# Patient Record
Sex: MALE | Race: WHITE | URBAN - METROPOLITAN AREA
[De-identification: names, ages, dates, MRNs, and addresses within clinical notes are randomized per-mention and may not be internally consistent; named-entity substitution may affect disease eponyms.]

---

## 2017-02-03 PROBLEM — Z71.2 ENCOUNTER TO DISCUSS TEST RESULTS: Status: ACTIVE | Noted: 2017-02-03

## 2017-05-12 PROBLEM — Z08 ENCOUNTER FOR FOLLOW-UP SURVEILLANCE OF COLON CANCER: Status: ACTIVE | Noted: 2017-05-12

## 2017-05-12 PROBLEM — Z85.038 ENCOUNTER FOR FOLLOW-UP SURVEILLANCE OF COLON CANCER: Status: ACTIVE | Noted: 2017-05-12

## 2017-08-03 PROBLEM — Z92.21 HISTORY OF CHEMOTHERAPY: Status: ACTIVE | Noted: 2017-08-03

## 2018-09-27 PROBLEM — Z71.2 ENCOUNTER TO DISCUSS TEST RESULTS: Status: RESOLVED | Noted: 2017-02-03 | Resolved: 2018-09-27

## 2020-01-20 NOTE — PROGRESS NOTES
[facility]) Surgical Oncology  [facility]    HPI: Dear Dr. Ronnie Harvey, Thank you for referring Mr. Narendra Antunez for management of liver masses. Taisha Monzon is followed by Dr Ronnie Harvey for a history of [id number] - perforating sigmoid colon cancer diagnosed in 2016. He is S/P sigmoid colectomy with colostomy. In January 2017, he completed adjuvant FOLFOX chemotherapy x 12 cycles. Had colostomy closure and then noted to have local recurrence. Pt underwent resection of colon recurrence with ileostomy. He then received off protocol adjuvant Xeloda, completing it in January 2019. January 2019 PET showed no evidence of residual or recurrent disease. Had ileostomy reversal and incisional hernia repair. Follow-up surveillance  CT scan in January 2020 demonstrated numerous liver masses. Patient is scheduled for a CT guided biopsy of the liver on 1/24/20 at [facility]. Currently denies fever's, nausea, vomiting, change in appetite or bowel pattern. No blood in stool.      Past Medical History:   Diagnosis Date    Allergic     Asthma     Cancer ([facility] [address]     Hypertension      Past Surgical History:   Procedure Laterality Date    COLECTOMY      TUNNELED VENOUS PORT PLACEMENT       Social:   Social History     Tobacco Use    Smoking status: Former Smoker     Packs/day: 1.00     Years: 15.00     Pack years: 15.00     Types: Cigarettes    Smokeless tobacco: Former User     Quit date: 1995   Substance Use Topics    Alcohol use: Yes     Comment: occasionally    Drug use: Not on file     Family History   Problem Relation Age of Onset    Alcohol Abuse Mother     Asthma Son      Allergies: Norvasc [amlodipine]  Current Outpatient Medications   Medication Sig Dispense Refill    pantoprazole (PROTONIX) 40 MG tablet TK 1 T PO QAM B PADMINI      vitamin D (CHOLECALCIFEROL) 1000 UNIT TABS tablet Take 1,000 Units by mouth daily      losartan (COZAAR) 100 MG tablet Take 100 mg by mouth daily       No current facility-administered medications for this visit. Review of Systems: See HPI  All other systems reviewed and are negative. Vitals:    01/21/20 0857   BP: 118/78   Pulse: 76   Temp: 97.5 °F (36.4 °C)   TempSrc: Oral   SpO2: 97%   Weight: 241 lb (109.3 kg)   Height: 5' 11\" (1.803 m)     Wt Readings from Last 3 Encounters:   01/21/20 241 lb (109.3 kg)   08/11/17 277 lb (125.6 kg)   08/07/17 275 lb 12.8 oz (125.1 kg)     Body mass index is 33.61 kg/m². Physical Exam:   Constitutional: He is oriented to person, place, and time. He appears well-developed and well-nourished. No distress. HENT: Moist mucus membranes  Head: Normocephalic and atraumatic. Eyes: EOM are normal. Pupils are equal, round, and no icterus. Neck: Normal range of motion. Neck supple. No thyromegaly present. Cardiovascular: Normal rate and regular rhythm by peripheral pulses. Pulmonary/Chest: No respiratory distress. Bilateral symmetrical chest rise. Abdominal: Soft. He exhibits no distension and no mass. There is no hepatosplenomegaly. No tenderness. Large midline incision well healed, multiple surgical sites well healed, no palpable hernias. Extremities: He exhibits no bilateral edema. Lymphadenopathy: He has no bilateral cervical adenopathy. Neurological: Grossly intact motor and sensory exam.   Skin: Skin is warm and dry. Psychiatric: He has a normal mood and affect. Appropriate thought process and judgement capacity. CEA 1/6/20: 3.41(High), previously 2.65 (OCT.)    CT Chest 1/18./20:     No metastases in the chest    Old benign T10 compression fracture    Numerous liver metastases, possibly enlarging slightly since 1/6/2020    Cholelithiasis    CT ABD/Pelvis 1/6/20:     Interval development of hepatic metastatic disease. Enlarged mesenteric lymph nodes consistent with malignancy. Interval ventral hernia repair. Cholelithiasis.  No additional CT evidence of cholecystitis. Assessment/Plan:   Diagnosis Orders   1.  Cancer of sigmoid colon ([address] 2. Liver mass     3. Mesenteric mass       Reviewed complex history pt has. I discussed with the Alethea Merlin about the diagnosis and management options:     - Patient will need to get a colonoscopy ASAP to evaluate for further colon tumors, patient understood and will follow up with Dr. Ivonne Araujo. - CEA has never been elevated significantly and so unable to use as an indicator. - CT scans reviewed and discussed with patient - appears to have one on left side and two on right side - technically resectable. No previous liver masses on previous scans, will review case with tumor board and have radiologist review on exact number of lesions and locations. - recommend genetic testing, patient follows with Dr. Roxanne Clay for his colon cancer   - patient scheduled for biopsy on 1/24 at [facility], discussed with patient possible bx of enlarged mesenteric mass / lymph nodes also. - discussed surgical options with patient and wife for liver masses; surgery vs ablation vs embolization with radiation seeds (therospheres). Avastin based chemotherapy was discussed as an option, as he has not yet been treated with it. Discussed stage 4 colon cancer and prognosis with patient and wife. - Recommend chemo first - discussed with Dr. Roxanne Clay. Depending on response, further chemo vs liver directed treatment vs resection of all known sites of disease. Skylar Monsivais MD  Surgical Oncologist    Shannon Sosa RN, am scribing for and in the presence of Dr Skylar Monsivais. Dayo Cheng RN,     I, Dr. Skylar Monsivais, personally performed the services described in this documentation as scribed by Dayo Cheng RN in my presence, and it is both accurate and complete.      Skylar Monsivais MD  Surgery Attending

## 2020-01-21 ENCOUNTER — OFFICE VISIT (OUTPATIENT)
Dept: SURGERY | Age: 55
End: 2020-01-21
Payer: COMMERCIAL

## 2020-01-21 VITALS
BODY MASS INDEX: 33.74 KG/M2 | HEART RATE: 76 BPM | SYSTOLIC BLOOD PRESSURE: 118 MMHG | OXYGEN SATURATION: 97 % | DIASTOLIC BLOOD PRESSURE: 78 MMHG | WEIGHT: 241 LBS | HEIGHT: 71 IN | TEMPERATURE: 97.5 F

## 2020-01-21 PROCEDURE — G8417 CALC BMI ABV UP PARAM F/U: HCPCS | Performed by: SURGERY

## 2020-01-21 PROCEDURE — G8427 DOCREV CUR MEDS BY ELIG CLIN: HCPCS | Performed by: SURGERY

## 2020-01-21 PROCEDURE — 99205 OFFICE O/P NEW HI 60 MIN: CPT | Performed by: SURGERY

## 2020-01-21 PROCEDURE — G8484 FLU IMMUNIZE NO ADMIN: HCPCS | Performed by: SURGERY

## 2020-01-21 RX ORDER — PANTOPRAZOLE SODIUM 40 MG/1
TABLET, DELAYED RELEASE ORAL
COMMUNITY
Start: 2019-10-20

## 2020-01-29 ENCOUNTER — TELEPHONE (OUTPATIENT)
Dept: SURGERY | Age: 55
End: 2020-01-29

## 2021-03-13 ENCOUNTER — IMMUNIZATION (OUTPATIENT)
Dept: PRIMARY CARE CLINIC | Age: 56
End: 2021-03-13
Payer: COMMERCIAL

## 2021-03-13 PROCEDURE — 0031A COVID-19, J&J VACCINE, PF, 0.5 ML DOSE: CPT | Performed by: FAMILY MEDICINE

## 2021-03-13 PROCEDURE — 91303 COVID-19, J&J VACCINE, PF, 0.5 ML DOSE: CPT | Performed by: FAMILY MEDICINE
